# Patient Record
Sex: MALE | Race: WHITE | Employment: OTHER | URBAN - METROPOLITAN AREA
[De-identification: names, ages, dates, MRNs, and addresses within clinical notes are randomized per-mention and may not be internally consistent; named-entity substitution may affect disease eponyms.]

---

## 2017-02-28 ENCOUNTER — ANESTHESIA EVENT (OUTPATIENT)
Dept: SURGERY | Age: 77
End: 2017-02-28
Payer: MEDICARE

## 2017-02-28 ENCOUNTER — HOSPITAL ENCOUNTER (OUTPATIENT)
Age: 77
Setting detail: OUTPATIENT SURGERY
Discharge: HOME | End: 2017-02-28
Attending: OPHTHALMOLOGY | Admitting: OPHTHALMOLOGY
Payer: MEDICARE

## 2017-02-28 ENCOUNTER — ANESTHESIA (OUTPATIENT)
Dept: SURGERY | Age: 77
End: 2017-02-28
Payer: MEDICARE

## 2017-02-28 ENCOUNTER — SURGERY (OUTPATIENT)
Age: 77
End: 2017-02-28

## 2017-02-28 VITALS
OXYGEN SATURATION: 100 % | HEART RATE: 66 BPM | HEIGHT: 69 IN | WEIGHT: 175.44 LBS | DIASTOLIC BLOOD PRESSURE: 80 MMHG | RESPIRATION RATE: 16 BRPM | TEMPERATURE: 97.7 F | SYSTOLIC BLOOD PRESSURE: 145 MMHG | BODY MASS INDEX: 25.99 KG/M2

## 2017-02-28 PROBLEM — H53.8 BLURRED VISION: Status: RESOLVED | Noted: 2017-02-28 | Resolved: 2017-02-28

## 2017-02-28 PROBLEM — H53.8 BLURRED VISION: Status: ACTIVE | Noted: 2017-02-28

## 2017-02-28 PROCEDURE — 74011250636 HC RX REV CODE- 250/636

## 2017-02-28 PROCEDURE — 76060000032 HC ANESTHESIA 0.5 TO 1 HR: Performed by: OPHTHALMOLOGY

## 2017-02-28 PROCEDURE — V2632 POST CHMBR INTRAOCULAR LENS: HCPCS | Performed by: OPHTHALMOLOGY

## 2017-02-28 PROCEDURE — 74011250636 HC RX REV CODE- 250/636: Performed by: OPHTHALMOLOGY

## 2017-02-28 PROCEDURE — 76010000138 HC OR TIME 0.5 TO 1 HR: Performed by: OPHTHALMOLOGY

## 2017-02-28 PROCEDURE — 77030013389: Performed by: OPHTHALMOLOGY

## 2017-02-28 PROCEDURE — 77030013340: Performed by: OPHTHALMOLOGY

## 2017-02-28 PROCEDURE — 77030022390 HC RETRCTR MALYGN RNG MSRT -C: Performed by: OPHTHALMOLOGY

## 2017-02-28 PROCEDURE — 74011000250 HC RX REV CODE- 250: Performed by: OPHTHALMOLOGY

## 2017-02-28 PROCEDURE — 76210000021 HC REC RM PH II 0.5 TO 1 HR: Performed by: OPHTHALMOLOGY

## 2017-02-28 DEVICE — LENS IOL POST 1-PC 6X13 20.0 -- ACRYSOF: Type: IMPLANTABLE DEVICE | Site: EYE | Status: FUNCTIONAL

## 2017-02-28 RX ORDER — SODIUM CHLORIDE, SODIUM LACTATE, POTASSIUM CHLORIDE, CALCIUM CHLORIDE 600; 310; 30; 20 MG/100ML; MG/100ML; MG/100ML; MG/100ML
75 INJECTION, SOLUTION INTRAVENOUS CONTINUOUS
Status: DISCONTINUED | OUTPATIENT
Start: 2017-02-28 | End: 2017-02-28 | Stop reason: HOSPADM

## 2017-02-28 RX ORDER — TROPICAMIDE 10 MG/ML
1 SOLUTION/ DROPS OPHTHALMIC
Status: COMPLETED | OUTPATIENT
Start: 2017-02-28 | End: 2017-02-28

## 2017-02-28 RX ORDER — PHENYLEPHRINE HYDROCHLORIDE 100 MG/ML
SOLUTION/ DROPS OPHTHALMIC AS NEEDED
Status: DISCONTINUED | OUTPATIENT
Start: 2017-02-28 | End: 2017-02-28 | Stop reason: HOSPADM

## 2017-02-28 RX ORDER — FLURBIPROFEN SODIUM 0.3 MG/ML
1 SOLUTION/ DROPS OPHTHALMIC
Status: COMPLETED | OUTPATIENT
Start: 2017-02-28 | End: 2017-02-28

## 2017-02-28 RX ORDER — EPINEPHRINE 1 MG/ML
INJECTION, SOLUTION, CONCENTRATE INTRAVENOUS AS NEEDED
Status: DISCONTINUED | OUTPATIENT
Start: 2017-02-28 | End: 2017-02-28 | Stop reason: HOSPADM

## 2017-02-28 RX ORDER — OFLOXACIN 3 MG/ML
1 SOLUTION/ DROPS OPHTHALMIC
Status: DISCONTINUED | OUTPATIENT
Start: 2017-02-28 | End: 2017-02-28 | Stop reason: HOSPADM

## 2017-02-28 RX ORDER — MIDAZOLAM HYDROCHLORIDE 1 MG/ML
INJECTION, SOLUTION INTRAMUSCULAR; INTRAVENOUS AS NEEDED
Status: DISCONTINUED | OUTPATIENT
Start: 2017-02-28 | End: 2017-02-28 | Stop reason: HOSPADM

## 2017-02-28 RX ORDER — PHENYLEPHRINE HYDROCHLORIDE 25 MG/ML
1 SOLUTION/ DROPS OPHTHALMIC
Status: COMPLETED | OUTPATIENT
Start: 2017-02-28 | End: 2017-02-28

## 2017-02-28 RX ORDER — FENTANYL CITRATE 50 UG/ML
INJECTION, SOLUTION INTRAMUSCULAR; INTRAVENOUS AS NEEDED
Status: DISCONTINUED | OUTPATIENT
Start: 2017-02-28 | End: 2017-02-28 | Stop reason: HOSPADM

## 2017-02-28 RX ADMIN — MIDAZOLAM HYDROCHLORIDE 1 MG: 1 INJECTION, SOLUTION INTRAMUSCULAR; INTRAVENOUS at 09:35

## 2017-02-28 RX ADMIN — SODIUM HYALURONATE 5.5 MG: 10 INJECTION INTRAOCULAR at 09:36

## 2017-02-28 RX ADMIN — BALANCED SALT SOLUTION 500 ML: 6.4; .75; .48; .3; 3.9; 1.7 SOLUTION OPHTHALMIC at 09:35

## 2017-02-28 RX ADMIN — MIDAZOLAM HYDROCHLORIDE 1 MG: 1 INJECTION, SOLUTION INTRAMUSCULAR; INTRAVENOUS at 09:24

## 2017-02-28 RX ADMIN — OFLOXACIN 1 DROP: 3 SOLUTION/ DROPS OPHTHALMIC at 08:20

## 2017-02-28 RX ADMIN — BALANCED SALT SOLUTION 15 ML: 6.4; .75; .48; .3; 3.9; 1.7 SOLUTION OPHTHALMIC at 09:32

## 2017-02-28 RX ADMIN — PHENYLEPHRINE HYDROCHLORIDE 1 DROP: 2.5 SOLUTION/ DROPS OPHTHALMIC at 08:31

## 2017-02-28 RX ADMIN — ACETYLCHOLINE CHLORIDE 20 MG: KIT at 09:36

## 2017-02-28 RX ADMIN — TROPICAMIDE 1 DROP: 10 SOLUTION/ DROPS OPHTHALMIC at 08:35

## 2017-02-28 RX ADMIN — PHENYLEPHRINE HYDROCHLORIDE 1 DROP: 2.5 SOLUTION/ DROPS OPHTHALMIC at 08:40

## 2017-02-28 RX ADMIN — TROPICAMIDE 1 DROP: 10 SOLUTION/ DROPS OPHTHALMIC at 08:41

## 2017-02-28 RX ADMIN — PHENYLEPHRINE HYDROCHLORIDE 1 DROP: 100 SOLUTION/ DROPS OPHTHALMIC at 09:30

## 2017-02-28 RX ADMIN — Medication 0.5 ML: at 09:37

## 2017-02-28 RX ADMIN — FLURBIPROFEN SODIUM 1 DROP: 0.3 SOLUTION/ DROPS OPHTHALMIC at 08:31

## 2017-02-28 RX ADMIN — LIDOCAINE HYDROCHLORIDE 2 DROP: 35 GEL OPHTHALMIC at 08:45

## 2017-02-28 RX ADMIN — TROPICAMIDE 1 DROP: 10 SOLUTION/ DROPS OPHTHALMIC at 08:31

## 2017-02-28 RX ADMIN — FLURBIPROFEN SODIUM 1 DROP: 0.3 SOLUTION/ DROPS OPHTHALMIC at 08:35

## 2017-02-28 RX ADMIN — EPINEPHRINE 0.75 MG: 1 INJECTION, SOLUTION INTRAMUSCULAR; SUBCUTANEOUS at 09:35

## 2017-02-28 RX ADMIN — FLURBIPROFEN SODIUM 1 DROP: 0.3 SOLUTION/ DROPS OPHTHALMIC at 08:25

## 2017-02-28 RX ADMIN — EPINEPHRINE 3 ML: 1 INJECTION, SOLUTION INTRAMUSCULAR; SUBCUTANEOUS at 09:36

## 2017-02-28 RX ADMIN — PHENYLEPHRINE HYDROCHLORIDE 1 DROP: 2.5 SOLUTION/ DROPS OPHTHALMIC at 08:35

## 2017-02-28 RX ADMIN — PHENYLEPHRINE HYDROCHLORIDE 1 DROP: 2.5 SOLUTION/ DROPS OPHTHALMIC at 08:20

## 2017-02-28 RX ADMIN — FLURBIPROFEN SODIUM 1 DROP: 0.3 SOLUTION/ DROPS OPHTHALMIC at 08:19

## 2017-02-28 RX ADMIN — TROPICAMIDE 1 DROP: 10 SOLUTION/ DROPS OPHTHALMIC at 08:26

## 2017-02-28 RX ADMIN — SODIUM CHLORIDE, SODIUM LACTATE, POTASSIUM CHLORIDE, AND CALCIUM CHLORIDE 75 ML/HR: 600; 310; 30; 20 INJECTION, SOLUTION INTRAVENOUS at 08:40

## 2017-02-28 RX ADMIN — FLURBIPROFEN SODIUM 1 DROP: 0.3 SOLUTION/ DROPS OPHTHALMIC at 08:41

## 2017-02-28 RX ADMIN — TROPICAMIDE 1 DROP: 10 SOLUTION/ DROPS OPHTHALMIC at 08:20

## 2017-02-28 RX ADMIN — PHENYLEPHRINE HYDROCHLORIDE 1 DROP: 2.5 SOLUTION/ DROPS OPHTHALMIC at 08:25

## 2017-02-28 RX ADMIN — FENTANYL CITRATE 50 MCG: 50 INJECTION, SOLUTION INTRAMUSCULAR; INTRAVENOUS at 09:24

## 2017-02-28 RX ADMIN — FENTANYL CITRATE 50 MCG: 50 INJECTION, SOLUTION INTRAMUSCULAR; INTRAVENOUS at 09:35

## 2017-02-28 NOTE — DISCHARGE INSTRUCTIONS
DISCHARGE SUMMARY from Nurse    The following personal items are in your possession at time of discharge:    Dental Appliances: None  Visual Aid: Glasses, Sent home     Home Medications: Kept at bedside (eye meds)  Jewelry: Ring, Sent home, Watch (to [facility])  Clothing: Pants, Footwear, Undergarments, Socks, [employer], Shirt, Jacket/Coat (locker 15)  Other Valuables: Eyeglasses, Sent home, Kuldip Costa (to [facility])             PATIENT INSTRUCTIONS:    After general anesthesia or intravenous sedation, for 24 hours or while taking prescription Narcotics:  · Limit your activities  · Do not drive and operate hazardous machinery  · Do not make important personal or business decisions  · Do  not drink alcoholic beverages  · If you have not urinated within 8 hours after discharge, please contact your surgeon on call. Report the following to your surgeon:  · Excessive pain, swelling, redness or odor of or around the surgical area  · Temperature over 100.5  · Nausea and vomiting lasting longer than 4 hours or if unable to take medications  · Any signs of decreased circulation or nerve impairment to extremity: change in color, persistent  numbness, tingling, coldness or increase pain  · Any questions        What to do at Home:  Regular diet  Please follow post op eye drop instructions received from dr Vivian Esposito  Return to office on Wednesday as scheduled    If you experience any of the following symptoms bleeding, nausea, severe pain, please follow up with dr Vivian Esposito      *  Please give a list of your current medications to your Primary Care Provider. *  Please update this list whenever your medications are discontinued, doses are      changed, or new medications (including over-the-counter products) are added. *  Please carry medication information at all times in case of emergency situations.           These are general instructions for a healthy lifestyle:    No smoking/ No tobacco products/ Avoid exposure to second hand smoke    Surgeon General's Warning:  Quitting smoking now greatly reduces serious risk to your health. Obesity, smoking, and sedentary lifestyle greatly increases your risk for illness    A healthy diet, regular physical exercise & weight monitoring are important for maintaining a healthy lifestyle    You may be retaining fluid if you have a history of heart failure or if you experience any of the following symptoms:  Weight gain of 3 pounds or more overnight or 5 pounds in a week, increased swelling in our hands or feet or shortness of breath while lying flat in bed. Please call your doctor as soon as you notice any of these symptoms; do not wait until your next office visit. Recognize signs and symptoms of STROKE:    F-face looks uneven    A-arms unable to move or move unevenly    S-speech slurred or non-existent    T-time-call 911 as soon as signs and symptoms begin-DO NOT go       Back to bed or wait to see if you get better-TIME IS BRAIN. Warning Signs of HEART ATTACK     Call 911 if you have these symptoms:   Chest discomfort. Most heart attacks involve discomfort in the center of the chest that lasts more than a few minutes, or that goes away and comes back. It can feel like uncomfortable pressure, squeezing, fullness, or pain.  Discomfort in other areas of the upper body. Symptoms can include pain or discomfort in one or both arms, the back, neck, jaw, or stomach.  Shortness of breath with or without chest discomfort.  Other signs may include breaking out in a cold sweat, nausea, or lightheadedness. Don't wait more than five minutes to call 911 - MINUTES MATTER! Fast action can save your life. Calling 911 is almost always the fastest way to get lifesaving treatment. Emergency Medical Services staff can begin treatment when they arrive -- up to an hour sooner than if someone gets to the hospital by car. The discharge information has been reviewed with the patient and caregiver.   The patient and caregiver verbalized understanding. Discharge medications reviewed with the patient and caregiver and appropriate educational materials and side effects teaching were provided. Cataract Surgery: What to Expect at Home  Your Recovery    After surgery, your eye will not hurt. But it may feel scratchy, sticky, or uncomfortable. It may also water more than usual.  Most people see better 1 to 3 days after surgery. But it could take 3 to 10 weeks to get the full benefits of surgery and to see as clearly as possible. Your doctor may send you home with a bandage, patch, or clear shield on your eye. This will keep you from rubbing your eye. Your doctor will also give you eyedrops to help your eye heal. Use them exactly as directed. You can read or watch TV right away, but things may look blurry. Most people are able to return to work or their normal routine in 1 to 3 days. After your eye heals, you may still need to wear glasses, especially for reading. This care sheet gives you a general idea about how long it will take for you to recover. But each person recovers at a different pace. Follow the steps below to get better as quickly as possible. How can you care for yourself at home? Activity  · Rest when you feel tired. Getting enough sleep will help you recover. · You may have trouble judging distances for a few days. Move slowly, and be careful going up and down stairs and pouring hot liquids. Ask for help if you need it. · Ask your doctor when it is okay to drive. · Wear your eye bandage, patch, or shield for as long as your doctor recommends. You may only need to wear it when you sleep. · You can shower or wash your hair the day after surgery. Keep water, soap, shampoo, hair spray, and shaving lotion out of your eye, especially for the first week. · Do not rub or put pressure on your eye for at least 1 week. · Do not wear eye makeup for 1 to 2 weeks.  You may also want to avoid face cream or lotion. · Do not get your hair colored or permed for 10 days after surgery. · Do not bend over or do any strenuous activities, such as biking, jogging, weight lifting, or aerobic exercise, for 2 weeks or until your doctor says it is okay. · Avoid swimming, hot tubs, gardening, and dusting for 1 to 2 weeks. · Wear sunglasses on bright days for at least 1 year after surgery. Medicines  · Your doctor will tell you if and when you can restart your medicines. He or she will also give you instructions about taking any new medicines. · If you take blood thinners, such as warfarin (Coumadin), clopidogrel (Plavix), or aspirin, be sure to talk to your doctor. He or she will tell you if and when to start taking those medicines again. Make sure that you understand exactly what your doctor wants you to do. · Follow your doctor's instructions for when to use your eyedrops. Always wash your hands before you put your drops in. To put in eyedrops:  ¨ Tilt your head back, and pull your lower eyelid down with one finger. ¨ Drop or squirt the medicine inside the lower lid. ¨ Close your eye for 30 to 60 seconds to let the drops or ointment move around. ¨ Do not touch the ointment or dropper tip to your eyelashes or any other surface. · Follow your doctor's instructions for taking pain medicines. Follow-up care is a key part of your treatment and safety. Be sure to make and go to all appointments, and call your doctor if you are having problems. It's also a good idea to know your test results and keep a list of the medicines you take. When should you call for help? Call 911 anytime you think you may need emergency care. For example, call if:  · You passed out (lost consciousness). · You have severe trouble breathing. · You have sudden chest pain and shortness of breath, or you cough up blood. Call your doctor now or seek immediate medical care if:  · You have eye pain.   · You have pus draining from your eye.  · Your vision gets worse. · Your eye is still red and bloodshot after 3 or 4 days. · You notice new floaters, flashes of light, or changes in your field of vision. Watch closely for changes in your health, and be sure to contact your doctor if you have any problems. Where can you learn more? Go to http://cathy-jovani.info/. Enter R255 in the search box to learn more about \"Cataract Surgery: What to Expect at Home. \"  Current as of: May 23, 2016  Content Version: 11.1  © 1186-8935 [employer]. Care instructions adapted under license by [employer] (which disclaims liability or warranty for this information). If you have questions about a medical condition or this instruction, always ask your healthcare professional. [address] any warranty or liability for your use of this information.     Patient armband removed and shredded

## 2017-02-28 NOTE — IP AVS SNAPSHOT
[address] [address] 
[phone] Patient: Gayathri Hoang 
MRN: [id number] RAX:[id number] You are allergic to the following No active allergies Recent Documentation Height 1.74 m Emergency Contacts Name Discharge Info Relation Home Work Mobile Coretta Choudhury DISCHARGE CAREGIVER [3] Spouse [3] [phone] About your hospitalization You were admitted on:  February 28, 2017 You last received care in the:  [address] [place] [address] You were discharged on:  February 28, 2017 Unit phone number:  [phone] Why you were hospitalized Your primary diagnosis was:  Blurred Vision Providers Seen During Your Hospitalizations Provider Role Specialty Primary office phone Sallie Alanis MD Attending Provider Ophthalmology [phone] Your Primary Care Physician (PCP) Primary Care Physician Office Phone Office Fax Dereje Dyson [phone] Follow-up Information Follow up With Details Comments Contact Info Piyush Whyte MD   [address] [place] [address] [address] [place] [address] 
[phone] Current Discharge Medication List  
  
CONTINUE these medications which have NOT CHANGED Dose & Instructions Dispensing Information Comments Morning Noon Evening Bedtime  
 ascorbic acid (vitamin C) 500 mg tablet Commonly known as:  VITAMIN C Your next dose is: Today, Tomorrow Other:  _________ Dose:  500 mg Take 500 mg by mouth daily. Refills:  0  
     
   
   
   
  
 aspirin 325 mg tablet Commonly known as:  ASPIRIN Your next dose is: Today, Tomorrow Other:  _________ Dose:  325 mg Take 325 mg by mouth daily. Refills:  0  
     
   
   
   
  
 CARDURA XL 4 mg XL tablet Generic drug:  doxazosin XL  
   
 Your next dose is: Today, Tomorrow Other:  _________ Dose:  8 mg Take 8 mg by mouth nightly. Refills:  0  
     
   
   
   
  
 donepezil 23 mg film coated tablet Commonly known as:  ARICEPT Your next dose is: Today, Tomorrow Other:  _________ Dose:  23 mg Take 23 mg by mouth nightly. Refills:  0  
     
   
   
   
  
 finasteride 5 mg tablet Commonly known as:  PROSCAR Your next dose is: Today, Tomorrow Other:  _________ TAKE 1 TABLET DAILY Quantity:  90 Tab Refills:  2 LIPITOR 10 mg tablet Generic drug:  atorvastatin Your next dose is: Today, Tomorrow Other:  _________ Dose:  10 mg Take 10 mg by mouth nightly. Refills:  0  
     
   
   
   
  
 metroNIDAZOLE 0.75 % topical cream  
Commonly known as:  METROCREAM  
   
Your next dose is: Today, Tomorrow Other:  _________ Apply  to affected area daily. Use a thin layer to affected areas after washing Refills:  0  
     
   
   
   
  
 NAMENDA XR 28 mg capsule Generic drug:  memantine ER Your next dose is: Today, Tomorrow Other:  _________ Dose:  28 mg Take 28 mg by mouth nightly. Refills:  0  
     
   
   
   
  
 * testosterone 1.62 % (40.5 mg/2.5 gram) Glpk Commonly known as:  ANDROGEL Your next dose is: Today, Tomorrow Other:  _________ Dose:  40.5 mg Apply 40.5 mg to affected area daily. Quantity:  3 Bottle Refills:  3 Apply 2 pumps TOTAL per day, 1 on each shoulder * testosterone 20.25 mg/1.25 gram (1.62 %) gel Commonly known as:  ANDROGEL Your next dose is: Today, Tomorrow Other:  _________ Dose:  40.5 mg Apply 40.5 mg to affected area daily. Quantity:  3 Bottle Refills:  3 Apply 2 pumps TOTAL per day, one on each shoulder * Notice: This list has 2 medication(s) that are the same as other medications prescribed for you. Read the directions carefully, and ask your doctor or other care provider to review them with you. Discharge Instructions DISCHARGE SUMMARY from Nurse The following personal items are in your possession at time of discharge: 
 
Dental Appliances: None Visual Aid: Glasses, Sent home Home Medications: Kept at bedside (eye meds) Jewelry: Ring, Sent home, Watch (to [facility]) Clothing: Pants, Footwear, Undergarments, Socks, [employer], [employer], Jacket/Coat (locker 15) Other Valuables: Eyeglasses, Sent home, Max Anger (to [facility]) PATIENT INSTRUCTIONS: 
 
After general anesthesia or intravenous sedation, for 24 hours or while taking prescription Narcotics: · Limit your activities · Do not drive and operate hazardous machinery · Do not make important personal or business decisions · Do  not drink alcoholic beverages · If you have not urinated within 8 hours after discharge, please contact your surgeon on call. Report the following to your surgeon: 
· Excessive pain, swelling, redness or odor of or around the surgical area · Temperature over 100.5 · Nausea and vomiting lasting longer than 4 hours or if unable to take medications · Any signs of decreased circulation or nerve impairment to extremity: change in color, persistent  numbness, tingling, coldness or increase pain · Any questions What to do at Home: 
Regular diet Please follow post op eye drop instructions received from dr Brenna Lorenzo Return to office on Wednesday as scheduled If you experience any of the following symptoms bleeding, nausea, severe pain, please follow up with dr Brenna Lorenzo *  Please give a list of your current medications to your Primary Care Provider.  
 
*  Please update this list whenever your medications are discontinued, doses are 
 changed, or new medications (including over-the-counter products) are added. *  Please carry medication information at all times in case of emergency situations. These are general instructions for a healthy lifestyle: No smoking/ No tobacco products/ Avoid exposure to second hand smoke Surgeon General's Warning:  Quitting smoking now greatly reduces serious risk to your health. Obesity, smoking, and sedentary lifestyle greatly increases your risk for illness A healthy diet, regular physical exercise & weight monitoring are important for maintaining a healthy lifestyle You may be retaining fluid if you have a history of heart failure or if you experience any of the following symptoms:  Weight gain of 3 pounds or more overnight or 5 pounds in a week, increased swelling in our hands or feet or shortness of breath while lying flat in bed. Please call your doctor as soon as you notice any of these symptoms; do not wait until your next office visit. Recognize signs and symptoms of STROKE: 
 
F-face looks uneven A-arms unable to move or move unevenly S-speech slurred or non-existent T-time-call 911 as soon as signs and symptoms begin-DO NOT go Back to bed or wait to see if you get better-TIME IS BRAIN. Warning Signs of HEART ATTACK Call 911 if you have these symptoms: 
? Chest discomfort. Most heart attacks involve discomfort in the center of the chest that lasts more than a few minutes, or that goes away and comes back. It can feel like uncomfortable pressure, squeezing, fullness, or pain. ? Discomfort in other areas of the upper body. Symptoms can include pain or discomfort in one or both arms, the back, neck, jaw, or stomach. ? Shortness of breath with or without chest discomfort. ? Other signs may include breaking out in a cold sweat, nausea, or lightheadedness. Don't wait more than five minutes to call [address]!  Fast action can save your life. Calling 911 is almost always the fastest way to get lifesaving treatment. Emergency Medical Services staff can begin treatment when they arrive  up to an hour sooner than if someone gets to the hospital by car. The discharge information has been reviewed with the patient and caregiver. The patient and caregiver verbalized understanding. Discharge medications reviewed with the patient and caregiver and appropriate educational materials and side effects teaching were provided. Cataract Surgery: What to Expect at [facility] Your Recovery After surgery, your eye will not hurt. But it may feel scratchy, sticky, or uncomfortable. It may also water more than usual. 
Most people see better 1 to 3 days after surgery. But it could take 3 to 10 weeks to get the full benefits of surgery and to see as clearly as possible. Your doctor may send you home with a bandage, patch, or clear shield on your eye. This will keep you from rubbing your eye. Your doctor will also give you eyedrops to help your eye heal. Use them exactly as directed. You can read or watch TV right away, but things may look blurry. Most people are able to return to work or their normal routine in 1 to 3 days. After your eye heals, you may still need to wear glasses, especially for reading. This care sheet gives you a general idea about how long it will take for you to recover. But each person recovers at a different pace. Follow the steps below to get better as quickly as possible. How can you care for yourself at home? Activity · Rest when you feel tired. Getting enough sleep will help you recover. · You may have trouble judging distances for a few days. Move slowly, and be careful going up and down stairs and pouring hot liquids. Ask for help if you need it. · Ask your doctor when it is okay to drive.  
· Wear your eye bandage, patch, or shield for as long as your doctor recommends. You may only need to wear it when you sleep. · You can shower or wash your hair the day after surgery. Keep water, soap, shampoo, hair spray, and shaving lotion out of your eye, especially for the first week. · Do not rub or put pressure on your eye for at least 1 week. · Do not wear eye makeup for 1 to 2 weeks. You may also want to avoid face cream or lotion. · Do not get your hair colored or permed for 10 days after surgery. · Do not bend over or do any strenuous activities, such as biking, jogging, weight lifting, or aerobic exercise, for 2 weeks or until your doctor says it is okay. · Avoid swimming, hot tubs, gardening, and dusting for 1 to 2 weeks. · Wear sunglasses on bright days for at least 1 year after surgery. Medicines · Your doctor will tell you if and when you can restart your medicines. He or she will also give you instructions about taking any new medicines. · If you take blood thinners, such as warfarin (Coumadin), clopidogrel (Plavix), or aspirin, be sure to talk to your doctor. He or she will tell you if and when to start taking those medicines again. Make sure that you understand exactly what your doctor wants you to do. · Follow your doctor's instructions for when to use your eyedrops. Always wash your hands before you put your drops in. To put in eyedrops: ¨ Tilt your head back, and pull your lower eyelid down with one finger. ¨ Drop or squirt the medicine inside the lower lid. ¨ Close your eye for 30 to 60 seconds to let the drops or ointment move around. ¨ Do not touch the ointment or dropper tip to your eyelashes or any other surface. · Follow your doctor's instructions for taking pain medicines. Follow-up care is a key part of your treatment and safety. Be sure to make and go to all appointments, and call your doctor if you are having problems. It's also a good idea to know your test results and keep a list of the medicines you take. When should you call for help? Call 911 anytime you think you may need emergency care. For example, call if: 
· You passed out (lost consciousness). · You have severe trouble breathing. · You have sudden chest pain and shortness of breath, or you cough up blood. Call your doctor now or seek immediate medical care if: 
· You have eye pain. · You have pus draining from your eye. · Your vision gets worse. · Your eye is still red and bloodshot after 3 or 4 days. · You notice new floaters, flashes of light, or changes in your field of vision. Watch closely for changes in your health, and be sure to contact your doctor if you have any problems. Where can you learn more? Go to http://cathy-jovani.info/. Enter R255 in the search box to learn more about \"Cataract Surgery: What to Expect at Home. \" Current as of: May 23, 2016 Content Version: 11.1 © 7593-6275 [employer]. Care instructions adapted under license by [employer] (which disclaims liability or warranty for this information). If you have questions about a medical condition or this instruction, always ask your healthcare professional. [address] any warranty or liability for your use of this information. Patient armband removed and shredded Discharge Orders None Introducing [address]! Kenia So introduces [employer] Mobile patient portal. Now you can access parts of your medical record, email your doctor's office, and request medication refills online. 1. In your internet browser, go to [email or web address] "[email or web address][email or web address] 2. Click on the First Time User? Click Here link in the Sign In box. You will see the New Member Sign Up page. 3. Enter your [employer] Access Code exactly as it appears below. You will not need to use this code after youve completed the sign-up process. If you do not sign up before the expiration date, you must request a new code. · EverySignal Access Code: [email or web address] Expires: 5/21/2017  9:35 AM 
 
4. Enter the last four digits of your Social Security Number (xxxx) and Date of Birth (mm/dd/yyyy) as indicated and click Submit. You will be taken to the next sign-up page. 5. Create a EverySignal ID. This will be your Every[employer] login ID and cannot be changed, so think of one that is secure and easy to remember. 6. Create a [employer] password. You can change your password at any time. 7. Enter your Password Reset Question and Answer. This can be used at a later time if you forget your password. 8. Enter your e-mail address. You will receive e-mail notification when new information is available in [address] 9. Click Sign Up. You can now view and download portions of your medical record. 10. Click the Download Summary menu link to download a portable copy of your medical information. If you have questions, please visit the Frequently Asked Questions section of the [employer] website. Remember, EverySignal is NOT to be used for urgent needs. For medical emergencies, dial 911. Now available from your iPhone and Android! General Information Please provide this summary of care documentation to your next provider. Patient Signature:  ____________________________________________________________ Date:  ____________________________________________________________  
  
[employer] Provider Signature:  ____________________________________________________________ Date:  ____________________________________________________________

## 2017-02-28 NOTE — OP NOTES
Cataract Operative Note      Patient: Luisa Wiggins III               Sex: male          DOA: 2/28/2017         YOB: 1940      Age:  68 y.o.        LOS:  LOS: 0 days     Preoperative Diagnosis: Cataract left eye    Postoperative Diagnosis:  Cataract  left eye  Surgeon: Cassie Szymanski M.D. Anesthesia:  Topical anesthesia  Procedure:  Phacoemulsification of posterior chamber for intraocular lens implantation left    Fluids:  0    Procedure in Detail: The operative eye was prepped and draped in the usual fashion. A lid speculum was placed in the operative eye. A clear cornea approach was utilized. A paracentesis incision(s) was constructed with a 1 mm slit knife. One percent preservative-free lidocaine followed by viscoelastic was instilled into the anterior chamber. A clear corneal incision was made with a slit knife. A continuous curvilinear capsulorrhexis was constructed followed by hydrodissection. A phacoemulsification tip was placed into the eye, and the lens nucleus was emulsified. The irrigation/aspiration device was then used to remove any remaining cortical material.  Polishing of the capsule was performed as needed. The intraocular lens was then placed into the capsular bag after it was re-inflated with viscoelastic. The remaining viscoelastic was then removed using the irrigation/aspiration device. BSS on a cannula was then used to hydrate the wound edges. At the end of the procedure the wound was found to be watertight, the anterior chamber was deep and the pupil round. No blood loss during surgery. An antibiotic and anti-inflammatroy was placed into the operative eye. The lid speculum was removed. Protective sunglasses were then placed onto the patient. The patient was taken to the [address] [place] [address] Unit (PACU) in good condition having tolerated the procedure well.     Estimated Blood Loss: 0                 Implants: * No implants in log *    Specimens: * No specimens in log *            Complications:  None           Roberto Carlos Sailors.  NATALIO Prado.June  9:32 AM

## 2017-02-28 NOTE — PERIOP NOTES
Mita informed, via Or phone, of pt having had regular ASA as recent as Saturday. She will inform Lenny Cain.

## 2017-02-28 NOTE — ANESTHESIA PREPROCEDURE EVALUATION
Anesthetic History   No history of anesthetic complications            Review of Systems / Medical History  Patient summary reviewed, nursing notes reviewed and pertinent labs reviewed    Pulmonary        Sleep apnea: CPAP           Neuro/Psych         TIA     Cardiovascular    Hypertension: well controlled              Exercise tolerance: >4 METS     GI/Hepatic/Renal             Pertinent negatives: No liver disease   Endo/Other  Within defined limits           Other Findings            Physical Exam    Airway  Mallampati: II  TM Distance: 4 - 6 cm  Neck ROM: normal range of motion   Mouth opening: Normal     Cardiovascular    Rhythm: regular  Rate: normal         Dental  No notable dental hx       Pulmonary  Breath sounds clear to auscultation               Abdominal  GI exam deferred       Other Findings            Anesthetic Plan    ASA: 3  Anesthesia type: MAC          Induction: Intravenous  Anesthetic plan and risks discussed with: Patient

## 2017-02-28 NOTE — ANESTHESIA POSTPROCEDURE EVALUATION
Post-Anesthesia Evaluation & Assessment    Visit Vitals    /82    Pulse 87    Temp 36.1 °C (97 °F)    Resp 16    Ht 5' 8.5\" (1.74 m)    Wt 79.6 kg (175 lb 7 oz)    SpO2 99%    BMI 26.29 kg/m2       No untreated/active PONV    Post-operative hydration adequate. Adequate post-operative analgesia per PACU discharge criteria    Mental status & level of consciousness: alert and oriented x 3    Respiratory status: patent unassisted airway     No apparent anesthetic complications requiring additional post-anesthetic care    Patient has met all discharge requirements.             Dion Cabrera MD

## 2017-02-28 NOTE — ANESTHESIA POSTPROCEDURE EVALUATION
Post-Anesthesia Evaluation and Assessment    Cardiovascular Function/Vital Signs  Visit Vitals    /85 (BP 1 Location: Right arm, BP Patient Position: At rest)    Pulse (!) 59    Temp 36.5 °C (97.7 °F)    Resp 16    Ht 5' 8.5\" (1.74 m)    Wt 79.6 kg (175 lb 7 oz)    SpO2 99%    BMI 26.29 kg/m2       Patient is status post Procedure(s):  CATARACT EXTRACTION WITH INTRA OCULAR LENS IMPLANT LEFT EYE. Nausea/Vomiting: Controlled. Postoperative hydration reviewed and adequate. Pain:  Pain Scale 1: Numeric (0 - 10) (02/28/17 1027)  Pain Intensity 1: 0 (02/28/17 1027)   Managed. Neurological Status:   Neuro (WDL): Within Defined Limits (02/28/17 0832)   At baseline. Mental Status and Level of Consciousness: Arousable. Pulmonary Status:   O2 Device: Room air (02/28/17 1027)   Adequate oxygenation and airway patent. Complications related to anesthesia: None    Post-anesthesia assessment completed. No concerns. Patient has met all discharge requirements.     Signed By: Rafy Hull CRNA    February 28, 2017

## 2017-02-28 NOTE — ANESTHESIA POSTPROCEDURE EVALUATION
Post-Anesthesia Evaluation & Assessment    Visit Vitals    /82    Pulse 87    Temp 36.1 °C (97 °F)    Resp 16    Ht 5' 8.5\" (1.74 m)    Wt 79.6 kg (175 lb 7 oz)    SpO2 99%    BMI 26.29 kg/m2       No untreated/active PONV    Post-operative hydration adequate. Adequate post-operative analgesia per PACU discharge criteria    Mental status & level of consciousness: alert and oriented x 3    Respiratory status: patent unassisted airway     No apparent anesthetic complications requiring additional post-anesthetic care    Patient has met all discharge requirements.             Deon Mota MD

## 2017-04-26 PROBLEM — N40.2 PROSTATE NODULE: Status: ACTIVE | Noted: 2017-04-26

## 2017-05-09 ENCOUNTER — ANESTHESIA EVENT (OUTPATIENT)
Dept: SURGERY | Age: 77
End: 2017-05-09
Payer: MEDICARE

## 2017-05-09 ENCOUNTER — ANESTHESIA (OUTPATIENT)
Dept: SURGERY | Age: 77
End: 2017-05-09
Payer: MEDICARE

## 2017-05-09 ENCOUNTER — HOSPITAL ENCOUNTER (OUTPATIENT)
Age: 77
Setting detail: OUTPATIENT SURGERY
Discharge: HOME | End: 2017-05-09
Attending: OPHTHALMOLOGY | Admitting: OPHTHALMOLOGY
Payer: MEDICARE

## 2017-05-09 VITALS
SYSTOLIC BLOOD PRESSURE: 120 MMHG | RESPIRATION RATE: 15 BRPM | TEMPERATURE: 97.2 F | DIASTOLIC BLOOD PRESSURE: 82 MMHG | WEIGHT: 175.31 LBS | BODY MASS INDEX: 25.97 KG/M2 | HEIGHT: 69 IN | OXYGEN SATURATION: 99 % | HEART RATE: 72 BPM

## 2017-05-09 PROBLEM — H53.8 BLURRED VISION: Status: ACTIVE | Noted: 2017-05-09

## 2017-05-09 PROBLEM — H53.8 BLURRED VISION: Status: RESOLVED | Noted: 2017-05-09 | Resolved: 2017-05-09

## 2017-05-09 PROCEDURE — 76060000032 HC ANESTHESIA 0.5 TO 1 HR: Performed by: OPHTHALMOLOGY

## 2017-05-09 PROCEDURE — 74011250636 HC RX REV CODE- 250/636

## 2017-05-09 PROCEDURE — 76010000138 HC OR TIME 0.5 TO 1 HR: Performed by: OPHTHALMOLOGY

## 2017-05-09 PROCEDURE — V2632 POST CHMBR INTRAOCULAR LENS: HCPCS | Performed by: OPHTHALMOLOGY

## 2017-05-09 PROCEDURE — 77030013340: Performed by: OPHTHALMOLOGY

## 2017-05-09 PROCEDURE — 77030013389: Performed by: OPHTHALMOLOGY

## 2017-05-09 PROCEDURE — 74011250636 HC RX REV CODE- 250/636: Performed by: OPHTHALMOLOGY

## 2017-05-09 PROCEDURE — 76210000021 HC REC RM PH II 0.5 TO 1 HR: Performed by: OPHTHALMOLOGY

## 2017-05-09 PROCEDURE — 74011000250 HC RX REV CODE- 250: Performed by: OPHTHALMOLOGY

## 2017-05-09 PROCEDURE — 77030022390 HC RETRCTR MALYGN RNG MSRT -C: Performed by: OPHTHALMOLOGY

## 2017-05-09 DEVICE — LENS IOL POST 1-PC 6X13 20.5 -- ACRYSOF: Type: IMPLANTABLE DEVICE | Site: EYE | Status: FUNCTIONAL

## 2017-05-09 RX ORDER — EPINEPHRINE 1 MG/ML
INJECTION, SOLUTION, CONCENTRATE INTRAVENOUS AS NEEDED
Status: DISCONTINUED | OUTPATIENT
Start: 2017-05-09 | End: 2017-05-09 | Stop reason: HOSPADM

## 2017-05-09 RX ORDER — MIDAZOLAM HYDROCHLORIDE 1 MG/ML
INJECTION, SOLUTION INTRAMUSCULAR; INTRAVENOUS AS NEEDED
Status: DISCONTINUED | OUTPATIENT
Start: 2017-05-09 | End: 2017-05-09 | Stop reason: HOSPADM

## 2017-05-09 RX ORDER — FENTANYL CITRATE 50 UG/ML
INJECTION, SOLUTION INTRAMUSCULAR; INTRAVENOUS AS NEEDED
Status: DISCONTINUED | OUTPATIENT
Start: 2017-05-09 | End: 2017-05-09 | Stop reason: HOSPADM

## 2017-05-09 RX ORDER — OFLOXACIN 3 MG/ML
1 SOLUTION/ DROPS OPHTHALMIC
Status: DISCONTINUED | OUTPATIENT
Start: 2017-05-09 | End: 2017-05-09 | Stop reason: HOSPADM

## 2017-05-09 RX ORDER — PHENYLEPHRINE HYDROCHLORIDE 100 MG/ML
1 SOLUTION/ DROPS OPHTHALMIC
Status: COMPLETED | OUTPATIENT
Start: 2017-05-09 | End: 2017-05-09

## 2017-05-09 RX ORDER — SODIUM CHLORIDE, SODIUM LACTATE, POTASSIUM CHLORIDE, CALCIUM CHLORIDE 600; 310; 30; 20 MG/100ML; MG/100ML; MG/100ML; MG/100ML
75 INJECTION, SOLUTION INTRAVENOUS CONTINUOUS
Status: DISCONTINUED | OUTPATIENT
Start: 2017-05-09 | End: 2017-05-09 | Stop reason: HOSPADM

## 2017-05-09 RX ORDER — FLURBIPROFEN SODIUM 0.3 MG/ML
1 SOLUTION/ DROPS OPHTHALMIC
Status: COMPLETED | OUTPATIENT
Start: 2017-05-09 | End: 2017-05-09

## 2017-05-09 RX ORDER — TROPICAMIDE 10 MG/ML
1 SOLUTION/ DROPS OPHTHALMIC
Status: COMPLETED | OUTPATIENT
Start: 2017-05-09 | End: 2017-05-09

## 2017-05-09 RX ADMIN — PHENYLEPHRINE HYDROCHLORIDE 1 DROP: 100 SOLUTION/ DROPS OPHTHALMIC at 09:17

## 2017-05-09 RX ADMIN — TROPICAMIDE 1 DROP: 10 SOLUTION/ DROPS OPHTHALMIC at 09:00

## 2017-05-09 RX ADMIN — PHENYLEPHRINE HYDROCHLORIDE 1 DROP: 100 SOLUTION/ DROPS OPHTHALMIC at 09:06

## 2017-05-09 RX ADMIN — FLURBIPROFEN SODIUM 1 DROP: 0.3 SOLUTION/ DROPS OPHTHALMIC at 09:17

## 2017-05-09 RX ADMIN — TROPICAMIDE 1 DROP: 10 SOLUTION/ DROPS OPHTHALMIC at 08:51

## 2017-05-09 RX ADMIN — LIDOCAINE HYDROCHLORIDE 2 DROP: 35 GEL OPHTHALMIC at 09:30

## 2017-05-09 RX ADMIN — MIDAZOLAM HYDROCHLORIDE 1 MG: 1 INJECTION, SOLUTION INTRAMUSCULAR; INTRAVENOUS at 09:43

## 2017-05-09 RX ADMIN — MIDAZOLAM HYDROCHLORIDE 1 MG: 1 INJECTION, SOLUTION INTRAMUSCULAR; INTRAVENOUS at 09:51

## 2017-05-09 RX ADMIN — PHENYLEPHRINE HYDROCHLORIDE 1 DROP: 100 SOLUTION/ DROPS OPHTHALMIC at 09:11

## 2017-05-09 RX ADMIN — PHENYLEPHRINE HYDROCHLORIDE 1 DROP: 100 SOLUTION/ DROPS OPHTHALMIC at 08:51

## 2017-05-09 RX ADMIN — FLURBIPROFEN SODIUM 1 DROP: 0.3 SOLUTION/ DROPS OPHTHALMIC at 09:12

## 2017-05-09 RX ADMIN — FENTANYL CITRATE 25 MCG: 50 INJECTION, SOLUTION INTRAMUSCULAR; INTRAVENOUS at 10:02

## 2017-05-09 RX ADMIN — FLURBIPROFEN SODIUM 1 DROP: 0.3 SOLUTION/ DROPS OPHTHALMIC at 09:06

## 2017-05-09 RX ADMIN — FENTANYL CITRATE 25 MCG: 50 INJECTION, SOLUTION INTRAMUSCULAR; INTRAVENOUS at 10:14

## 2017-05-09 RX ADMIN — FLURBIPROFEN SODIUM 1 DROP: 0.3 SOLUTION/ DROPS OPHTHALMIC at 09:00

## 2017-05-09 RX ADMIN — PHENYLEPHRINE HYDROCHLORIDE 1 DROP: 100 SOLUTION/ DROPS OPHTHALMIC at 09:00

## 2017-05-09 RX ADMIN — TROPICAMIDE 1 DROP: 10 SOLUTION/ DROPS OPHTHALMIC at 09:17

## 2017-05-09 RX ADMIN — LIDOCAINE HYDROCHLORIDE 2 DROP: 35 GEL OPHTHALMIC at 09:18

## 2017-05-09 RX ADMIN — SODIUM CHLORIDE, SODIUM LACTATE, POTASSIUM CHLORIDE, AND CALCIUM CHLORIDE 75 ML/HR: 600; 310; 30; 20 INJECTION, SOLUTION INTRAVENOUS at 09:14

## 2017-05-09 RX ADMIN — FLURBIPROFEN SODIUM 1 DROP: 0.3 SOLUTION/ DROPS OPHTHALMIC at 08:52

## 2017-05-09 RX ADMIN — TROPICAMIDE 1 DROP: 10 SOLUTION/ DROPS OPHTHALMIC at 09:12

## 2017-05-09 RX ADMIN — FENTANYL CITRATE 50 MCG: 50 INJECTION, SOLUTION INTRAMUSCULAR; INTRAVENOUS at 09:47

## 2017-05-09 RX ADMIN — OFLOXACIN 1 DROP: 3 SOLUTION/ DROPS OPHTHALMIC at 08:51

## 2017-05-09 RX ADMIN — TROPICAMIDE 1 DROP: 10 SOLUTION/ DROPS OPHTHALMIC at 09:06

## 2017-05-09 NOTE — ANESTHESIA PREPROCEDURE EVALUATION
Anesthetic History        Pertinent negatives: No PONV       Review of Systems / Medical History  Patient summary reviewed, nursing notes reviewed and pertinent labs reviewed    Pulmonary        Sleep apnea: No treatment      Pertinent negatives: No asthma     Neuro/Psych       CVA (TIA)  TIA (2000)    Comments: H/o dementia; pt appropriate during interview Cardiovascular    Hypertension (did not take any meds this morning): well controlled        Dysrhythmias (h/o palpitations, PAC's)   Hyperlipidemia  Pertinent negatives: No past MI and angina  Exercise tolerance: >4 METS     GI/Hepatic/Renal         Renal disease (weak stream)  Liver disease (h/o jaundice in the 1950's)  Pertinent negatives: No GERD   Endo/Other        Arthritis  Pertinent negatives: No obesity   Other Findings              Physical Exam    Airway  Mallampati: II  TM Distance: 4 - 6 cm  Neck ROM: normal range of motion   Mouth opening: Normal     Cardiovascular    Rhythm: regular  Rate: normal         Dental  No notable dental hx       Pulmonary  Breath sounds clear to auscultation               Abdominal  GI exam deferred       Other Findings            Anesthetic Plan    ASA: 3  Anesthesia type: MAC            Anesthetic plan and risks discussed with: Patient      Pt had cataract done prior with 2mg versed and 100mcg fentanyl. Pt doesn't remember procedure. Pt would like the same anesthesia as last time.   Plan topical/MAC

## 2017-05-09 NOTE — PERIOP NOTES
Contacted OR desk for right eye shield - pt and wife unsure if they can locate the one from the last surgery

## 2017-05-09 NOTE — IP AVS SNAPSHOT
Constantinokatt Karlie 
 
 
 [address] [address] 
[phone] Patient: Nettie José MRN: [id number] You are allergic to the following No active allergies Recent Documentation Height Weight BMI Smoking Status 1.74 m 79.5 kg 26.27 kg/m2 Never Smoker Emergency Contacts Name Discharge Info Relation Home Work Mobile Coretta Choudhury DISCHARGE CAREGIVER [3] Spouse [3] [phone] About your hospitalization You were admitted on:  May 9, 2017 You last received care in the:  [address] [place] [address] You were discharged on:  May 9, 2017 Unit phone number:  [phone] Why you were hospitalized Your primary diagnosis was:  Blurred Vision Providers Seen During Your Hospitalizations Provider Role Specialty Primary office phone Gurdeep Tim MD Attending Provider Ophthalmology [phone] Your Primary Care Physician (PCP) Primary Care Physician Office Phone Office Fax Todjose Yoan [phone] Follow-up Information Follow up With Details Comments Contact Info Mattie Garcia MD   [address] [facility] [place] [address] [address] [facility] 
[phone] Gurdeep Tim MD Follow up in 1 day(s)  [address] [facility] [address] [place] [address] 
[phone] Your Appointments Thursday May 11, 2017  9:15 AM EDT  
BIOPSY with Elsy Mooney MD  
[facility] of [address]) [address] [address] [facility]  
[phone] Thursday May 25, 2017  9:15 AM EDT  
ESTABLISHED PATIENT with Elsy Mooney MD  
[facility] of [address]) [address] [address] [facility]  
[phone] Current Discharge Medication List  
  
 CONTINUE these medications which have NOT CHANGED Dose & Instructions Dispensing Information Comments Morning Noon Evening Bedtime  
 ascorbic acid (vitamin C) 500 mg tablet Commonly known as:  VITAMIN C Your last dose was: Your next dose is:    
   
   
 Dose:  500 mg Take 500 mg by mouth daily. Refills:  0  
     
   
   
   
  
 aspirin 325 mg tablet Commonly known as:  ASPIRIN Your last dose was: Your next dose is:    
   
   
 Dose:  325 mg Take 325 mg by mouth daily. Refills:  0  
     
   
   
   
  
 CARDURA XL 4 mg XL tablet Generic drug:  doxazosin XL Your last dose was: Your next dose is:    
   
   
 Dose:  8 mg Take 8 mg by mouth nightly. Refills:  0  
     
   
   
   
  
 cefdinir 300 mg capsule Commonly known as:  OMNICEF Your last dose was: Your next dose is: Take 1 tablet 2 hours prior to procedure. Quantity:  1 Cap Refills:  0  
     
   
   
   
  
 donepezil 23 mg film coated tablet Commonly known as:  ARICEPT Your last dose was: Your next dose is:    
   
   
 Dose:  23 mg Take 23 mg by mouth nightly. Refills:  0  
     
   
   
   
  
 finasteride 5 mg tablet Commonly known as:  PROSCAR Your last dose was: Your next dose is: TAKE 1 TABLET DAILY Quantity:  90 Tab Refills:  0 LIPITOR 10 mg tablet Generic drug:  atorvastatin Your last dose was: Your next dose is:    
   
   
 Dose:  10 mg Take 10 mg by mouth nightly. Refills:  0  
     
   
   
   
  
 NAMENDA XR 28 mg capsule Generic drug:  memantine ER Your last dose was: Your next dose is:    
   
   
 Dose:  28 mg Take 28 mg by mouth nightly. Refills:  0  
     
   
   
   
  
 testosterone 20.25 mg/1.25 gram (1.62 %) gel Commonly known as:  ANDROGEL Your last dose was: Your next dose is: Dose:  40.5 mg Apply 40.5 mg to affected area daily. Quantity:  3 Bottle Refills:  3 Apply 2 pumps TOTAL per day, one on each shoulder Discharge Instructions FOLLOW DROP SCHEDULE FROM DR. ISIDRO OFFICE Cataract Surgery: What to Expect at [facility] Your Recovery After surgery, your eye will not hurt. But it may feel scratchy, sticky, or uncomfortable. It may also water more than usual. 
Most people see better 1 to 3 days after surgery. But it could take 3 to 10 weeks to get the full benefits of surgery and to see as clearly as possible. Your doctor may send you home with a bandage, patch, or clear shield on your eye. This will keep you from rubbing your eye. Your doctor will also give you eyedrops to help your eye heal. Use them exactly as directed. You can watch TV, but things may look blurry. Most people are able to return to work or their normal routine in 1 to 3 days. After your eye heals, you may still need to wear glasses, especially for reading. This care sheet gives you a general idea about how long it will take for you to recover. But each person recovers at a different pace. Follow the steps below to get better as quickly as possible. How can you care for yourself at home? Activity · Rest when you feel tired. Getting enough sleep will help you recover. · You may have trouble judging distances for a few days. Move slowly, and be careful going up and down stairs and pouring hot liquids. Ask for help if you need it. · Ask your doctor when it is okay to drive. · Wear your eye bandage, patch, or shield for as long as your doctor recommends. You may only need to wear it when you sleep. · You can shower or wash your hair the day after surgery. Keep water, soap, shampoo, hair spray, and shaving lotion out of your eye, especially for the first week. · Do not rub or put pressure on your eye for at least 1 week. · Do not wear eye makeup for 1 to 2 weeks. You may also want to avoid face cream or lotion. · Do not get your hair colored or permed for 10 days after surgery. · Do not bend over or do any strenuous activities, such as biking, jogging, weight lifting, or aerobic exercise, for 2 weeks or until your doctor says it is okay. · Avoid swimming, hot tubs, gardening, and dusting for 1 to 2 weeks. · Wear sunglasses on bright days for at least 1 year after surgery. Medicines · Your doctor will tell you if and when you can restart your medicines. He or she will also give you instructions about taking any new medicines. · If you take blood thinners, such as warfarin (Coumadin), clopidogrel (Plavix), or aspirin, be sure to talk to your doctor. He or she will tell you if and when to start taking those medicines again. Make sure that you understand exactly what your doctor wants you to do. · Follow your doctor's instructions for when to use your eyedrops. Always wash your hands before you put your drops in. To put in eyedrops: ¨ Tilt your head back, and pull your lower eyelid down with one finger. ¨ Drop or squirt the medicine inside the lower lid. ¨ Close your eye for 30 to 60 seconds to let the drops or ointment move around. ¨ Do not touch the ointment or dropper tip to your eyelashes or any other surface. · Follow your doctor's instructions for taking pain medicines. Follow-up care is a key part of your treatment and safety. Be sure to make and go to all appointments, and call your doctor if you are having problems. It's also a good idea to know your test results and keep a list of the medicines you take. When should you call for help? Call 911 anytime you think you may need emergency care. For example, call if: 
· You passed out (lost consciousness). · You have severe trouble breathing. · You have sudden chest pain and shortness of breath, or you cough up blood. Call your doctor now or seek immediate medical care if: 
· You have eye pain. · You have pus draining from your eye. · Your vision gets worse. · Your eye is still red and bloodshot after 3 or 4 days. · You notice new floaters, flashes of light, or changes in your field of vision. Watch closely for changes in your health, and be sure to contact your doctor if you have any problems. Where can you learn more? Go to http://cathy-jovani.info/. Enter R255 in the search box to learn more about \"Cataract Surgery: What to Expect at Home. \" Current as of: May 23, 2016 Content Version: 11.2 © 9708-4880 [employer]. Care instructions adapted under license by [employer] (which disclaims liability or warranty for this information). If you have questions about a medical condition or this instruction, always ask your healthcare professional. [place] [address] any warranty or liability for your use of this information. DISCHARGE SUMMARY from Nurse The following personal items are in your possession at time of discharge: 
 
Dental Appliances: None Visual Aid: Glasses, Sent home Home Medications: None Jewelry: Ring (given to wife ) Clothing: Footwear, Socks, Shirt, [employer] (locker 7) Other Valuables: Steve Misty, Cell Phone (given to spouse) PATIENT INSTRUCTIONS: 
 
 
F-face looks uneven A-arms unable to move or move unevenly S-speech slurred or non-existent T-time-call 911 as soon as signs and symptoms begin-DO NOT go  
 Back to bed or wait to see if you get better-TIME IS BRAIN. Warning Signs of HEART ATTACK Call 911 if you have these symptoms: 
? Chest discomfort. Most heart attacks involve discomfort in the center of the chest that lasts more than a few minutes, or that goes away and comes back. It can feel like uncomfortable pressure, squeezing, fullness, or pain. ? Discomfort in other areas of the upper body. Symptoms can include pain or discomfort in one or both arms, the back, neck, jaw, or stomach. ? Shortness of breath with or without chest discomfort. ? Other signs may include breaking out in a cold sweat, nausea, or lightheadedness. Don't wait more than five minutes to call [address]! Fast action can save your life. Calling 911 is almost always the fastest way to get lifesaving treatment. Emergency Medical Services staff can begin treatment when they arrive  up to an hour sooner than if someone gets to the hospital by car. The discharge information has been reviewed with the patient and caregiver. The patient and caregiver verbalized understanding. Discharge medications reviewed with the patient and caregiver and appropriate educational materials and side effects teaching were provided. Patient armband removed and shredded. Discharge Orders None Introducing [facility]! [employer] Mary Jane introduces [employer] patient portal. Now you can access parts of your medical record, email your doctor's office, and request medication refills online. 1. In your internet browser, go to [email or web address] 2. Click on the First Time User? Click Here link in the Sign In box. You will see the New Member Sign Up page. 3. Enter your [employer] Access Code exactly as it appears below. You will not need to use this code after youve completed the sign-up process. If you do not sign up before the expiration date, you must request a new code. · [employer] Access Code: [email or web address] Expires: 5/21/2017 10:35 AM 
 
4. Enter the last four digits of your Social Security Number (xxxx) and Date of Birth (mm/dd/yyyy) as indicated and click Submit. You will be taken to the next sign-up page. 5. Create a [employer] ID. This will be your [employer] login ID and cannot be changed, so think of one that is secure and easy to remember. 6. Create a [employer] password. You can change your password at any time. 7. Enter your Password Reset Question and Answer. This can be used at a later time if you forget your password. 8. Enter your e-mail address. You will receive e-mail notification when new information is available in [address] 9. Click Sign Up. You can now view and download portions of your medical record. 10. Click the Download Summary menu link to download a portable copy of your medical information. If you have questions, please visit the Frequently Asked Questions section of the [employer] website. Remember, [employer] is NOT to be used for urgent needs. For medical emergencies, dial 911. Now available from your iPhone and Android! General Information Please provide this summary of care documentation to your next provider. Patient Signature:  ____________________________________________________________ Date:  ____________________________________________________________  
  
Trino Bough Provider Signature:  ____________________________________________________________ Date:  ____________________________________________________________

## 2017-05-09 NOTE — OP NOTES
Cataract Operative Note      Patient: Louann Becerra               Sex: male          DOA: 5/9/2017         YOB: 1940      Age:  68 y.o.        LOS:  LOS: 0 days     Preoperative Diagnosis: Cataract right eye    Postoperative Diagnosis:  Cataract  right eye  Surgeon: Ruthann Feliciano M.D. Anesthesia:  Topical anesthesia  Procedure:  Phacoemulsification of posterior chamber for intraocular lens implantation right    Fluids:  0    Procedure in Detail: The operative eye was prepped and draped in the usual fashion. A lid speculum was placed in the operative eye. A clear cornea approach was utilized. A paracentesis incision(s) was constructed with a 1 mm slit knife. One percent preservative-free lidocaine followed by viscoelastic was instilled into the anterior chamber. A clear corneal incision was made with a slit knife. A continuous curvilinear capsulorrhexis was constructed followed by hydrodissection. A phacoemulsification tip was placed into the eye, and the lens nucleus was emulsified. The irrigation/aspiration device was then used to remove any remaining cortical material.  Polishing of the capsule was performed as needed. The intraocular lens was then placed into the capsular bag after it was re-inflated with viscoelastic. The remaining viscoelastic was then removed using the irrigation/aspiration device. BSS on a cannula was then used to hydrate the wound edges. At the end of the procedure the wound was found to be watertight, the anterior chamber was deep and the pupil round. No blood loss during surgery. An antibiotic and anti-inflammatroy was placed into the operative eye. The lid speculum was removed. Protective sunglasses were then placed onto the patient. The patient was taken to the [address] [place] [address] Unit (PACU) in good condition having tolerated the procedure well.     Estimated Blood Loss: 0                 Implants: * No implants in log *    Specimens: * No specimens in log *            Complications:  None           Ronnette Bosworth.  Selina Babin M.D.  [unfilled]  9:45 AM

## 2017-05-09 NOTE — DISCHARGE INSTRUCTIONS
FOLLOW DROP SCHEDULE FROM DR. ISIDRO OFFICE       Cataract Surgery: What to Expect at Home  Your Recovery    After surgery, your eye will not hurt. But it may feel scratchy, sticky, or uncomfortable. It may also water more than usual.  Most people see better 1 to 3 days after surgery. But it could take 3 to 10 weeks to get the full benefits of surgery and to see as clearly as possible. Your doctor may send you home with a bandage, patch, or clear shield on your eye. This will keep you from rubbing your eye. Your doctor will also give you eyedrops to help your eye heal. Use them exactly as directed. You can watch TV, but things may look blurry. Most people are able to return to work or their normal routine in 1 to 3 days. After your eye heals, you may still need to wear glasses, especially for reading. This care sheet gives you a general idea about how long it will take for you to recover. But each person recovers at a different pace. Follow the steps below to get better as quickly as possible. How can you care for yourself at home? Activity  · Rest when you feel tired. Getting enough sleep will help you recover. · You may have trouble judging distances for a few days. Move slowly, and be careful going up and down stairs and pouring hot liquids. Ask for help if you need it. · Ask your doctor when it is okay to drive. · Wear your eye bandage, patch, or shield for as long as your doctor recommends. You may only need to wear it when you sleep. · You can shower or wash your hair the day after surgery. Keep water, soap, shampoo, hair spray, and shaving lotion out of your eye, especially for the first week. · Do not rub or put pressure on your eye for at least 1 week. · Do not wear eye makeup for 1 to 2 weeks. You may also want to avoid face cream or lotion. · Do not get your hair colored or permed for 10 days after surgery.   · Do not bend over or do any strenuous activities, such as biking, jogging, weight lifting, or aerobic exercise, for 2 weeks or until your doctor says it is okay. · Avoid swimming, hot tubs, gardening, and dusting for 1 to 2 weeks. · Wear sunglasses on bright days for at least 1 year after surgery. Medicines  · Your doctor will tell you if and when you can restart your medicines. He or she will also give you instructions about taking any new medicines. · If you take blood thinners, such as warfarin (Coumadin), clopidogrel (Plavix), or aspirin, be sure to talk to your doctor. He or she will tell you if and when to start taking those medicines again. Make sure that you understand exactly what your doctor wants you to do. · Follow your doctor's instructions for when to use your eyedrops. Always wash your hands before you put your drops in. To put in eyedrops:  ¨ Tilt your head back, and pull your lower eyelid down with one finger. ¨ Drop or squirt the medicine inside the lower lid. ¨ Close your eye for 30 to 60 seconds to let the drops or ointment move around. ¨ Do not touch the ointment or dropper tip to your eyelashes or any other surface. · Follow your doctor's instructions for taking pain medicines. Follow-up care is a key part of your treatment and safety. Be sure to make and go to all appointments, and call your doctor if you are having problems. It's also a good idea to know your test results and keep a list of the medicines you take. When should you call for help? Call 911 anytime you think you may need emergency care. For example, call if:  · You passed out (lost consciousness). · You have severe trouble breathing. · You have sudden chest pain and shortness of breath, or you cough up blood. Call your doctor now or seek immediate medical care if:  · You have eye pain. · You have pus draining from your eye. · Your vision gets worse. · Your eye is still red and bloodshot after 3 or 4 days. · You notice new floaters, flashes of light, or changes in your field of vision.   Watch closely for changes in your health, and be sure to contact your doctor if you have any problems. Where can you learn more? Go to http://cathy-jovani.info/. Enter R255 in the search box to learn more about \"Cataract Surgery: What to Expect at Home. \"  Current as of: May 23, 2016  Content Version: 11.2  © 7383-2774 [employer]. Care instructions adapted under license by [employer] (which disclaims liability or warranty for this information). If you have questions about a medical condition or this instruction, always ask your healthcare professional. [place] [address] any warranty or liability for your use of this information. DISCHARGE SUMMARY from Nurse    The following personal items are in your possession at time of discharge:    Dental Appliances: None  Visual Aid: Glasses, Sent home     Home Medications: None  Jewelry: Ring (given to wife )  Clothing: Footwear, Socks, Shirt, Belt (locker 7)  Other Valuables: Zaira Watts (given to spouse)             PATIENT INSTRUCTIONS:    After general anesthesia or intravenous sedation, for 24 hours or while taking prescription Narcotics:  · Limit your activities  · Do not drive and operate hazardous machinery  · Do not make important personal or business decisions  · Do  not drink alcoholic beverages  · If you have not urinated within 8 hours after discharge, please contact your surgeon on call.     Report the following to your surgeon:  · Excessive pain, swelling, redness or odor of or around the surgical area  · Temperature over 100.5  · Nausea and vomiting lasting longer than 4 hours or if unable to take medications  · Any signs of decreased circulation or nerve impairment to extremity: change in color, persistent  numbness, tingling, coldness or increase pain  · Any questions        What to do at Home:  Recommended activity: Activity as tolerated    If you experience any of the following symptoms above as listed, headache,or eye pain not relieved with Tylenol, increasing pressure to eye, and nausea or vomiting, please follow up with Dr. Nery Huffman. *  Please give a list of your current medications to your Primary Care Provider. *  Please update this list whenever your medications are discontinued, doses are      changed, or new medications (including over-the-counter products) are added. *  Please carry medication information at all times in case of emergency situations. These are general instructions for a healthy lifestyle:    No smoking/ No tobacco products/ Avoid exposure to second hand smoke    Surgeon General's Warning:  Quitting smoking now greatly reduces serious risk to your health. Obesity, smoking, and sedentary lifestyle greatly increases your risk for illness    A healthy diet, regular physical exercise & weight monitoring are important for maintaining a healthy lifestyle    You may be retaining fluid if you have a history of heart failure or if you experience any of the following symptoms:  Weight gain of 3 pounds or more overnight or 5 pounds in a week, increased swelling in our hands or feet or shortness of breath while lying flat in bed. Please call your doctor as soon as you notice any of these symptoms; do not wait until your next office visit. Recognize signs and symptoms of STROKE:    F-face looks uneven    A-arms unable to move or move unevenly    S-speech slurred or non-existent    T-time-call 911 as soon as signs and symptoms begin-DO NOT go       Back to bed or wait to see if you get better-TIME IS BRAIN. Warning Signs of HEART ATTACK     Call 911 if you have these symptoms:   Chest discomfort. Most heart attacks involve discomfort in the center of the chest that lasts more than a few minutes, or that goes away and comes back. It can feel like uncomfortable pressure, squeezing, fullness, or pain.    Discomfort in other areas of the upper body. Symptoms can include pain or discomfort in one or both arms, the back, neck, jaw, or stomach.  Shortness of breath with or without chest discomfort.  Other signs may include breaking out in a cold sweat, nausea, or lightheadedness. Don't wait more than five minutes to call 911 - MINUTES MATTER! Fast action can save your life. Calling 911 is almost always the fastest way to get lifesaving treatment. Emergency Medical Services staff can begin treatment when they arrive -- up to an hour sooner than if someone gets to the hospital by car. The discharge information has been reviewed with the patient and caregiver. The patient and caregiver verbalized understanding. Discharge medications reviewed with the patient and caregiver and appropriate educational materials and side effects teaching were provided. Patient armband removed and shredded.

## 2017-05-09 NOTE — ANESTHESIA POSTPROCEDURE EVALUATION
Post-Anesthesia Evaluation and Assessment    Cardiovascular Function/Vital Signs  Visit Vitals    /80 (BP 1 Location: Left arm, BP Patient Position: At rest)    Pulse (!) 55    Temp 36.2 °C (97.1 °F)    Resp 14    Ht 5' 8.5\" (1.74 m)    Wt 79.5 kg (175 lb 5 oz)    SpO2 99%    BMI 26.27 kg/m2       Patient is status post Procedure(s):  COMPLEX CATARACT EXTRACTION WITH INTRA OCULAR LENS IMPLANT RIGHT EYE. Nausea/Vomiting: Controlled. Postoperative hydration reviewed and adequate. Pain:  Pain Scale 1: Numeric (0 - 10) (05/09/17 1058)  Pain Intensity 1: 2 (05/09/17 1058)   Managed. Neurological Status:   Neuro (WDL): Within Defined Limits (05/09/17 1043)   At baseline. Mental Status and Level of Consciousness: Arousable. Pulmonary Status:   O2 Device: Room air (05/09/17 1058)   Adequate oxygenation and airway patent. Complications related to anesthesia: None    Post-anesthesia assessment completed. No concerns. Patient has met all discharge requirements.     Signed By: Ayaka Gordon CRNA    May 9, 2017

## 2017-05-09 NOTE — PERIOP NOTES
Reviewed PTA medication list with patient/caregiver and patient/caregiver denies any additional medications. \

## 2017-05-25 PROBLEM — C61 PROSTATE CANCER (HCC): Status: ACTIVE | Noted: 2017-05-25

## 2017-08-01 ENCOUNTER — HOSPITAL ENCOUNTER (OUTPATIENT)
Dept: LAB | Age: 77
Discharge: HOME | End: 2017-08-01

## 2018-02-06 PROBLEM — G20 PARKINSON'S DISEASE (HCC): Status: ACTIVE | Noted: 2017-09-07

## 2018-02-06 PROBLEM — Z91.81 AT HIGH RISK FOR FALLS: Status: ACTIVE | Noted: 2017-06-20

## 2019-02-18 PROBLEM — G56.01 CARPAL TUNNEL SYNDROME OF RIGHT WRIST: Status: ACTIVE | Noted: 2018-09-20

## 2019-02-18 PROBLEM — Z86.010 HISTORY OF COLON POLYPS: Status: ACTIVE | Noted: 2018-10-15

## 2019-02-18 PROBLEM — Z12.11 ENCOUNTER FOR SCREENING COLONOSCOPY: Status: ACTIVE | Noted: 2018-10-15

## (undated) DEVICE — MICROSURGICAL INSTRUMENT HYDRODISSECTION CANNULA 27GA, 1.57MM BEND (AKAHOSHI STYLE): Brand: ALCON

## (undated) DEVICE — Device: Brand: MALYUGIN RING SYSTEM 6.25MM

## (undated) DEVICE — CANN VISCOFLOW FRM 27G 22MM --

## (undated) DEVICE — DEVON™ KNEE AND BODY STRAP 60" X 3" (1.5 M X 7.6 CM): Brand: DEVON

## (undated) DEVICE — Device

## (undated) DEVICE — 3M™ TEGADERM™ I.V. SECUREMENT DRESSING, 9525HP, 2-1/2 IN X 2-3/4 IN (6.5 CM X 7 CM), 100/CT 4CT/CASE: Brand: 3M™ TEGADERM™